# Patient Record
Sex: FEMALE | Race: WHITE | NOT HISPANIC OR LATINO | Employment: OTHER | RURAL
[De-identification: names, ages, dates, MRNs, and addresses within clinical notes are randomized per-mention and may not be internally consistent; named-entity substitution may affect disease eponyms.]

---

## 2023-09-06 ENCOUNTER — OFFICE VISIT (OUTPATIENT)
Dept: FAMILY MEDICINE | Facility: CLINIC | Age: 83
End: 2023-09-06
Payer: MEDICARE

## 2023-09-06 VITALS
HEIGHT: 68 IN | TEMPERATURE: 97 F | BODY MASS INDEX: 23.42 KG/M2 | WEIGHT: 154.5 LBS | HEART RATE: 80 BPM | RESPIRATION RATE: 18 BRPM | DIASTOLIC BLOOD PRESSURE: 64 MMHG | OXYGEN SATURATION: 96 % | SYSTOLIC BLOOD PRESSURE: 122 MMHG

## 2023-09-06 DIAGNOSIS — J06.9 UPPER RESPIRATORY TRACT INFECTION, UNSPECIFIED TYPE: ICD-10-CM

## 2023-09-06 DIAGNOSIS — J06.9 ACUTE UPPER RESPIRATORY INFECTION: Primary | ICD-10-CM

## 2023-09-06 PROCEDURE — 99213 OFFICE O/P EST LOW 20 MIN: CPT | Mod: 25,,, | Performed by: NURSE PRACTITIONER

## 2023-09-06 PROCEDURE — 99213 PR OFFICE/OUTPT VISIT, EST, LEVL III, 20-29 MIN: ICD-10-PCS | Mod: 25,,, | Performed by: NURSE PRACTITIONER

## 2023-09-06 PROCEDURE — 96372 PR INJECTION,THERAP/PROPH/DIAG2ST, IM OR SUBCUT: ICD-10-PCS | Mod: ,,, | Performed by: NURSE PRACTITIONER

## 2023-09-06 PROCEDURE — 96372 THER/PROPH/DIAG INJ SC/IM: CPT | Mod: ,,, | Performed by: NURSE PRACTITIONER

## 2023-09-06 RX ORDER — CEFTRIAXONE 1 G/1
1 INJECTION, POWDER, FOR SOLUTION INTRAMUSCULAR; INTRAVENOUS
Status: COMPLETED | OUTPATIENT
Start: 2023-09-06 | End: 2023-09-06

## 2023-09-06 RX ORDER — NITROGLYCERIN 0.4 MG/1
0.4 TABLET SUBLINGUAL EVERY 5 MIN PRN
COMMUNITY

## 2023-09-06 RX ORDER — HYDRALAZINE HYDROCHLORIDE 50 MG/1
50 TABLET, FILM COATED ORAL DAILY
COMMUNITY

## 2023-09-06 RX ORDER — POTASSIUM CHLORIDE 1.5 G/1.58G
20 POWDER, FOR SOLUTION ORAL ONCE
COMMUNITY

## 2023-09-06 RX ORDER — GABAPENTIN 100 MG/1
100 CAPSULE ORAL DAILY
COMMUNITY

## 2023-09-06 RX ORDER — DEXAMETHASONE SODIUM PHOSPHATE 4 MG/ML
4 INJECTION, SOLUTION INTRA-ARTICULAR; INTRALESIONAL; INTRAMUSCULAR; INTRAVENOUS; SOFT TISSUE
Status: COMPLETED | OUTPATIENT
Start: 2023-09-06 | End: 2023-09-06

## 2023-09-06 RX ORDER — AMLODIPINE BESYLATE 2.5 MG/1
2.5 TABLET ORAL DAILY
COMMUNITY

## 2023-09-06 RX ORDER — CLONIDINE HYDROCHLORIDE 0.1 MG/1
0.1 TABLET ORAL 2 TIMES DAILY PRN
COMMUNITY

## 2023-09-06 RX ORDER — METOPROLOL TARTRATE 50 MG/1
50 TABLET ORAL 3 TIMES DAILY
COMMUNITY

## 2023-09-06 RX ORDER — CEFDINIR 300 MG/1
300 CAPSULE ORAL 2 TIMES DAILY
Qty: 20 CAPSULE | Refills: 0 | Status: SHIPPED | OUTPATIENT
Start: 2023-09-06 | End: 2023-09-16

## 2023-09-06 RX ORDER — LOSARTAN POTASSIUM 50 MG/1
50 TABLET ORAL 2 TIMES DAILY
COMMUNITY

## 2023-09-06 RX ADMIN — DEXAMETHASONE SODIUM PHOSPHATE 4 MG: 4 INJECTION, SOLUTION INTRA-ARTICULAR; INTRALESIONAL; INTRAMUSCULAR; INTRAVENOUS; SOFT TISSUE at 03:09

## 2023-09-06 RX ADMIN — CEFTRIAXONE 1 G: 1 INJECTION, POWDER, FOR SOLUTION INTRAMUSCULAR; INTRAVENOUS at 03:09

## 2023-09-06 NOTE — PATIENT INSTRUCTIONS
Rx as directed, Tylenol/Motrin PRN fever, discomfort, Increase PO fluid intake to maintain hydration status, thin respiratory secretions; We discussed the importance of not completely suppressing cough as this is the body's natural mechanism to prevent pneumonia; RTC for persisting and/or worsening of symptoms

## 2023-09-06 NOTE — PROGRESS NOTES
"Subjective:       Darcy Yee is a 82 y.o. female who presents for evaluation of symptoms of a URI. Symptoms include congestion, cough described as productive, headache described as pressure, nasal congestion, no  fever, and sinus pressure. Onset of symptoms was 3 weeks ago, and has been unchanged since that time. Treatment to date: antihistamines and decongestants.    Review of Systems  Pertinent items are noted in HPI.     Objective:      /64 (BP Location: Left arm, Patient Position: Sitting, BP Method: Medium (Manual))   Pulse 80   Temp 97 °F (36.1 °C) (Temporal)   Resp 18   Ht 5' 8" (1.727 m)   Wt 70.1 kg (154 lb 8 oz)   SpO2 96%   BMI 23.49 kg/m²   General appearance: alert, appears stated age, and cooperative  Head: Normocephalic, without obvious abnormality, atraumatic  Eyes: conjunctivae/corneas clear. PERRL, EOM's intact. Fundi benign.  Ears: abnormal TM right ear - dull and abnormal TM left ear - dull  Nose: mild congestion, sinus tenderness bilateral  Throat: abnormal findings: mild oropharyngeal erythema and injected with pnd  Lungs: clear to auscultation bilaterally  Heart: regular rate and rhythm, S1, S2 normal, no murmur, click, rub or gallop  Extremities: extremities normal, atraumatic, no cyanosis or edema  Skin: Skin color, texture, turgor normal. No rashes or lesions  Lymph nodes: Cervical, supraclavicular, and axillary nodes normal.  Neurologic: Alert and oriented X 3, normal strength and tone. Normal symmetric reflexes. Normal coordination and gait     Assessment:      Bacterial URI     Plan:      Discussed diagnosis and treatment of URI.  Suggested symptomatic OTC remedies.  Nasal saline spray for congestion.  Cefdinir per orders.  Follow up as needed.   "

## 2024-03-20 ENCOUNTER — OFFICE VISIT (OUTPATIENT)
Dept: FAMILY MEDICINE | Facility: CLINIC | Age: 84
End: 2024-03-20
Payer: MEDICARE

## 2024-03-20 VITALS
HEIGHT: 68 IN | BODY MASS INDEX: 23.43 KG/M2 | RESPIRATION RATE: 20 BRPM | OXYGEN SATURATION: 97 % | TEMPERATURE: 98 F | SYSTOLIC BLOOD PRESSURE: 124 MMHG | DIASTOLIC BLOOD PRESSURE: 68 MMHG | WEIGHT: 154.56 LBS | HEART RATE: 67 BPM

## 2024-03-20 DIAGNOSIS — N30.00 ACUTE CYSTITIS WITHOUT HEMATURIA: Primary | ICD-10-CM

## 2024-03-20 DIAGNOSIS — R35.1 FREQUENT URINATION AT NIGHT: ICD-10-CM

## 2024-03-20 DIAGNOSIS — R30.0 DYSURIA: ICD-10-CM

## 2024-03-20 LAB
BILIRUB SERPL-MCNC: NEGATIVE MG/DL
BLOOD URINE, POC: NEGATIVE
CLARITY, POC UA: CLEAR
COLOR, POC UA: ABNORMAL
GLUCOSE UR QL STRIP: NEGATIVE
KETONES UR QL STRIP: NEGATIVE
LEUKOCYTE ESTERASE URINE, POC: ABNORMAL
NITRITE, POC UA: POSITIVE
PH, POC UA: 5
PROTEIN, POC: NEGATIVE
SPECIFIC GRAVITY, POC UA: 1.02
UROBILINOGEN, POC UA: 0.2

## 2024-03-20 PROCEDURE — 99213 OFFICE O/P EST LOW 20 MIN: CPT | Mod: 25,,, | Performed by: NURSE PRACTITIONER

## 2024-03-20 PROCEDURE — 87186 SC STD MICRODIL/AGAR DIL: CPT | Mod: ,,, | Performed by: CLINICAL MEDICAL LABORATORY

## 2024-03-20 PROCEDURE — 81002 URINALYSIS NONAUTO W/O SCOPE: CPT | Mod: ,,, | Performed by: NURSE PRACTITIONER

## 2024-03-20 PROCEDURE — 96372 THER/PROPH/DIAG INJ SC/IM: CPT | Mod: ,,, | Performed by: NURSE PRACTITIONER

## 2024-03-20 PROCEDURE — 87086 URINE CULTURE/COLONY COUNT: CPT | Mod: ,,, | Performed by: CLINICAL MEDICAL LABORATORY

## 2024-03-20 PROCEDURE — 87077 CULTURE AEROBIC IDENTIFY: CPT | Mod: ,,, | Performed by: CLINICAL MEDICAL LABORATORY

## 2024-03-20 RX ORDER — NITROFURANTOIN 25; 75 MG/1; MG/1
100 CAPSULE ORAL 2 TIMES DAILY
Qty: 10 CAPSULE | Refills: 0 | Status: SHIPPED | OUTPATIENT
Start: 2024-03-20 | End: 2024-03-25

## 2024-03-20 RX ORDER — CEFTRIAXONE 1 G/1
1 INJECTION, POWDER, FOR SOLUTION INTRAMUSCULAR; INTRAVENOUS
Status: COMPLETED | OUTPATIENT
Start: 2024-03-20 | End: 2024-03-20

## 2024-03-20 RX ADMIN — CEFTRIAXONE 1 G: 1 INJECTION, POWDER, FOR SOLUTION INTRAMUSCULAR; INTRAVENOUS at 02:03

## 2024-03-20 NOTE — PROGRESS NOTES
"Subjective:      Darcy Yee is a 83 y.o. female who complains of burning with urination and frequency. She has had symptoms for a few days. Patient also complains of back pain and fever. Patient denies vaginal discharge. Patient does not have a history of recurrent UTI. Patient does not have a history of pyelonephritis.     Review of Systems  Pertinent items are noted in HPI.      Objective:      /68   Pulse 67   Temp 97.6 °F (36.4 °C)   Resp 20   Ht 5' 8" (1.727 m)   Wt 70.1 kg (154 lb 8.7 oz)   SpO2 97%   BMI 23.50 kg/m²   General appearance: alert, appears stated age, and cooperative  Back: symmetric, no curvature. ROM normal. No CVA tenderness.  Lungs: clear to auscultation bilaterally  Heart: regular rate and rhythm, S1, S2 normal, no murmur, click, rub or gallop  Extremities: extremities normal, atraumatic, no cyanosis or edema  Skin: Skin color, texture, turgor normal. No rashes or lesions  Neurologic: Alert and oriented X 3, normal strength and tone. Normal symmetric reflexes. Normal coordination and gait    Laboratory:   Urine dipstick:  see results .    Micro exam: not done.      Assessment:      Acute cystitis       Plan:      Medications: nitrofurantoin.  Maintain adequate hydration.  Follow up if symptoms not improving, and as needed.   "

## 2024-03-22 LAB — UA COMPLETE W REFLEX CULTURE PNL UR: ABNORMAL

## 2024-12-16 ENCOUNTER — OFFICE VISIT (OUTPATIENT)
Dept: FAMILY MEDICINE | Facility: CLINIC | Age: 84
End: 2024-12-16
Payer: MEDICARE

## 2024-12-16 VITALS
WEIGHT: 156 LBS | HEIGHT: 68 IN | RESPIRATION RATE: 20 BRPM | TEMPERATURE: 97 F | SYSTOLIC BLOOD PRESSURE: 132 MMHG | BODY MASS INDEX: 23.64 KG/M2 | OXYGEN SATURATION: 95 % | HEART RATE: 85 BPM | DIASTOLIC BLOOD PRESSURE: 78 MMHG

## 2024-12-16 DIAGNOSIS — R35.1 FREQUENT URINATION AT NIGHT: ICD-10-CM

## 2024-12-16 DIAGNOSIS — J01.90 ACUTE NON-RECURRENT SINUSITIS, UNSPECIFIED LOCATION: Primary | ICD-10-CM

## 2024-12-16 DIAGNOSIS — R30.0 DYSURIA: ICD-10-CM

## 2024-12-16 DIAGNOSIS — N30.90 CYSTITIS WITHOUT HEMATURIA: ICD-10-CM

## 2024-12-16 LAB
BILIRUB SERPL-MCNC: NEGATIVE MG/DL
BLOOD URINE, POC: NEGATIVE
CLARITY, POC UA: ABNORMAL
COLOR, POC UA: ABNORMAL
GLUCOSE UR QL STRIP: NEGATIVE
KETONES UR QL STRIP: NEGATIVE
LEUKOCYTE ESTERASE URINE, POC: ABNORMAL
NITRITE, POC UA: NEGATIVE
PH, POC UA: 6.5
PROTEIN, POC: NEGATIVE
SPECIFIC GRAVITY, POC UA: 1.01
UROBILINOGEN, POC UA: 0.2

## 2024-12-16 PROCEDURE — 96372 THER/PROPH/DIAG INJ SC/IM: CPT | Mod: ,,, | Performed by: NURSE PRACTITIONER

## 2024-12-16 PROCEDURE — 87077 CULTURE AEROBIC IDENTIFY: CPT | Mod: ,,, | Performed by: CLINICAL MEDICAL LABORATORY

## 2024-12-16 PROCEDURE — 87086 URINE CULTURE/COLONY COUNT: CPT | Mod: ,,, | Performed by: CLINICAL MEDICAL LABORATORY

## 2024-12-16 PROCEDURE — 81002 URINALYSIS NONAUTO W/O SCOPE: CPT | Mod: ,,, | Performed by: NURSE PRACTITIONER

## 2024-12-16 PROCEDURE — 99213 OFFICE O/P EST LOW 20 MIN: CPT | Mod: 25,,, | Performed by: NURSE PRACTITIONER

## 2024-12-16 PROCEDURE — 87186 SC STD MICRODIL/AGAR DIL: CPT | Mod: ,,, | Performed by: CLINICAL MEDICAL LABORATORY

## 2024-12-16 RX ORDER — CEFTRIAXONE 1 G/1
1 INJECTION, POWDER, FOR SOLUTION INTRAMUSCULAR; INTRAVENOUS
Status: COMPLETED | OUTPATIENT
Start: 2024-12-16 | End: 2024-12-16

## 2024-12-16 RX ORDER — CEFDINIR 300 MG/1
300 CAPSULE ORAL 2 TIMES DAILY
Qty: 20 CAPSULE | Refills: 0 | Status: SHIPPED | OUTPATIENT
Start: 2024-12-16 | End: 2024-12-26

## 2024-12-16 RX ORDER — DEXAMETHASONE SODIUM PHOSPHATE 4 MG/ML
8 INJECTION, SOLUTION INTRA-ARTICULAR; INTRALESIONAL; INTRAMUSCULAR; INTRAVENOUS; SOFT TISSUE
Status: COMPLETED | OUTPATIENT
Start: 2024-12-16 | End: 2024-12-16

## 2024-12-16 RX ADMIN — DEXAMETHASONE SODIUM PHOSPHATE 8 MG: 4 INJECTION, SOLUTION INTRA-ARTICULAR; INTRALESIONAL; INTRAMUSCULAR; INTRAVENOUS; SOFT TISSUE at 12:12

## 2024-12-16 RX ADMIN — CEFTRIAXONE 1 G: 1 INJECTION, POWDER, FOR SOLUTION INTRAMUSCULAR; INTRAVENOUS at 12:12

## 2024-12-18 LAB — UA COMPLETE W REFLEX CULTURE PNL UR: ABNORMAL

## 2024-12-30 NOTE — PROGRESS NOTES
Shanthi Mora NP   [address] [place] [address]     PATIENT NAME: Darcy Yee  : 1940  DATE: 24  MRN: [id number]      Billing Provider: Shanthi Mora NP  Level of Service: [place] OFFICE/OUTPT VISIT, EST, LEVL III, 20-29 MIN  Patient PCP Information       Provider PCP Type    Arturo Groves MD General            Reason for Visit / Chief Complaint: Ear Fullness, Sinus Problem (X2 to 3 weeks, has facial sinus pressure, nasal drainage ans congestion, frontal headache, and eyes carolee), and Urinary Tract Infection (Lower abdomen pressure, discomfort, frequent urination at night )       Update PCP  Update Chief Complaint         History of Present Illness / Problem Focused Workflow     Darcy Yee presents to the clinic with Ear Fullness, Sinus Problem (X2 to 3 weeks, has facial sinus pressure, nasal drainage ans congestion, frontal headache, and eyes carolee), and Urinary Tract Infection (Lower abdomen pressure, discomfort, frequent urination at night )     History of Present Illness    HPI:  Patient reports sinus symptoms for a few weeks, including ear fullness, congestion, headache, sinus pressure, and hoarseness. The course has been fluctuating, with periods of improvement followed by exacerbation. She has been self-medicating with Mucinex and Claritin, with uncertain efficacy.    In the last few days, the patient has noticed increased urinary frequency, particularly at night, urinating 4-5 times the previous night compared to her usual once. She reports some lower abdominal pressure but denies burning or spasms associated with urination. Patient mentions drinking less fluid than usual due to a recent 4-hour drive.    Patient is under urologist care for an unspecified condition. She received treatment for a similar issue in the past, including a Rocephin shot around February before going on a cruise. Patient also has a history of atrial fibrillation (AFib).    MEDICATIONS:  Patient is on  Mucinex and Claritin.    MEDICAL HISTORY:  Patient has a history of atrial fibrillation and UTIs. She received a Rocephin shot in March, likely for UTI prevention before a cruise.    FAMILY HISTORY:  Family history is significant for her daughter, Eileen, who has had defibrillator and pacemaker implantation. Eileen has an underlying heart condition that was exacerbated after COVID-19 infection.    SURGICAL HISTORY:  Patient underwent pacemaker implantation when she was over 68 years old.    TEST RESULTS:  A recent urinalysis showed a minimal amount of bacteria, potentially normal skin bacteria from the sample. A urine culture from March, prior to her cruise, was sensitive to Macrobid.      ROS:  General: -fever, -chills, -fatigue, -weight gain, -weight loss  Eyes: -vision changes, -redness, -discharge  ENT: -ear pain, +nasal congestion, -sore throat  Cardiovascular: -chest pain, -palpitations, -lower extremity edema  Respiratory: -cough, -shortness of breath  Gastrointestinal: -abdominal pain, -nausea, -vomiting, -diarrhea, -constipation, -blood in stool  Genitourinary: -dysuria, -hematuria, +frequency  Musculoskeletal: -joint pain, -muscle pain  Skin: -rash, -lesion  Neurological: +headache, -dizziness, -numbness, -tingling  Psychiatric: -anxiety, -depression, -sleep difficulty                Medical / Social / Family History     Past Medical History:   Diagnosis Date    Hypertension        History reviewed. No pertinent surgical history.    Social History  Ms.  reports that she has never smoked. She has never been exposed to tobacco smoke. She has never used smokeless tobacco. She reports that she does not drink alcohol and does not use drugs.    Family History  Ms.'s family history is not on file.    Medications and Allergies     Medications  No outpatient medications have been marked as taking for the 12/16/24 encounter (Office Visit) with Shanthi Mora NP.       Allergies  Review of patient's allergies  "indicates:   Allergen Reactions    Celecoxib Swelling    Aspirin Hives    Dye     Iodinated contrast media Hives    Lisinopril Hives    Nsaids (non-steroidal anti-inflammatory drug)      Other Reaction(s):  Swelling, Hives    Lidocaine hcl Nausea And Vomiting    Penicillins Rash and Hives       Physical Examination   /78 (BP Location: Left arm, Patient Position: Sitting)   Pulse 85   Temp 97.2 °F (36.2 °C)   Resp 20   Ht 5' 7.99" (1.727 m)   Wt 70.8 kg (156 lb)   LMP  (Exact Date)   SpO2 95%   BMI 23.73 kg/m²    Physical Exam    General: No acute distress. Well-developed. Well-nourished.  Eyes: EOMI. Sclerae anicteric.  HENT: Normocephalic. Atraumatic. Nares patent. Moist oral mucosa. Nasal congestion present. Posterior pharynx inj. With PND. Bilateral TM Dull.  Cardiovascular: Regular rate. Regular rhythm. No murmurs. No rubs. No gallops. Normal S1, S2.  Respiratory: Normal respiratory effort. Clear to auscultation bilaterally. No rales. No rhonchi. No wheezing.  Musculoskeletal: No  obvious deformity.  Extremities: No lower extremity edema.  Neurological: Alert & oriented x3. No slurred speech. Normal gait.  Psychiatric: Normal mood. Normal affect. Good insight. Good judgment.  Skin: Warm. Dry. No rash.           Assessment and Plan (including Health Maintenance)      Problem List  Smart Sets  Document Outside HM   :    Assessment & Plan    IMPRESSION:  - Considered sinus infection due to persistent symptoms of ear fullness, congestion, headache, and sinus pressure  - Evaluated for possible UTI based on recent nocturia, though minimal bacteria on dipstick  - Will treat empirically for both sinus infection and potential UTI  - Opted for steroid injection for sinus symptoms, considering patient's history of atrial fibrillation  - Chose Cefdinir as oral antibiotic for sinus infection  - Selected Rocephin injection for potential UTI coverage pending culture results    SINUS INFECTION:  - Prescribed " Cefdinir for sinus infection.  - Administered steroid injection for sinus symptoms.  - Continued Mucinex and Claritin as previously prescribed.  - Patient reports ongoing sinus symptoms for 2 weeks, including ear fullness, congestion, headache, and sinus pressure, with fluctuating intensity.  - Evaluated the effectiveness of current medications (Mucinex and Claritin) for symptom relief.  - Instructed the patient to follow up if symptoms persist or worsen despite treatment.  - Patient reports hoarseness.  - Assessed the patient's hoarseness and its potential relation to the sinus infection.    URINARY TRACT INFECTION (UTI):  - Administered Rocephin injection for potential UTI.  - Ordered urine culture, with results expected on Wednesday.  - Patient reports increased urinary frequency, especially nocturia, but denies dysuria or bladder spasms.  - Performed dipstick test, which showed minimal bacteriuria.  - Instructed the patient to contact the office when urine culture results are available on Wednesday.  - Advised to follow up if UTI symptoms persist or worsen.  - Plan to prescribe oral antibiotic (possibly Macrobid) based on urine culture results.    ATRIAL FIBRILLATION:  - Acknowledged the patient's history of atrial fibrillation.  - Assessed the potential impact of current symptoms on the patient's cardiac condition.  - Noted the presence of a pacemaker in the patient's medical history.  - Considered potential interactions between current symptoms, treatments, and the patient's cardiac device.              Health Maintenance Due   Topic Date Due    Lipid Panel  Never done    TETANUS VACCINE  Never done    Shingles Vaccine (1 of 2) Never done    Pneumococcal Vaccines (Age 50+) (1 of 1 - PCV) Never done    RSV Vaccine (Age 60+ and Pregnant patients) (1 - 1-dose 75+ series) Never done    COVID-19 Vaccine (3 - 2024-25 season) 09/01/2024       Problem List Items Addressed This Visit    None  Visit Diagnoses        Acute non-recurrent sinusitis, unspecified location    -  Primary    Relevant Medications    dexAMETHasone injection 8 mg (Completed)    cefTRIAXone injection 1 g (Completed)    Dysuria        Relevant Orders    POCT URINE DIPSTICK WITHOUT MICROSCOPE (Completed)    Frequent urination at night        Relevant Orders    POCT URINE DIPSTICK WITHOUT MICROSCOPE (Completed)    Cystitis without hematuria        Relevant Medications    cefTRIAXone injection 1 g (Completed)    Other Relevant Orders    POCT URINE DIPSTICK WITHOUT MICROSCOPE (Completed)    Urine culture (Completed)            The patient has no Health Maintenance topics of status Not Due    No future appointments.         Signature:  Shantih Mora NP      [address]   [place] [address]    Date of encounter: 12/16/24

## 2025-06-25 ENCOUNTER — OFFICE VISIT (OUTPATIENT)
Dept: FAMILY MEDICINE | Facility: CLINIC | Age: 85
End: 2025-06-25
Payer: MEDICARE

## 2025-06-25 VITALS
OXYGEN SATURATION: 95 % | WEIGHT: 157.69 LBS | TEMPERATURE: 98 F | RESPIRATION RATE: 20 BRPM | HEIGHT: 67 IN | HEART RATE: 62 BPM | SYSTOLIC BLOOD PRESSURE: 140 MMHG | DIASTOLIC BLOOD PRESSURE: 82 MMHG | BODY MASS INDEX: 24.75 KG/M2

## 2025-06-25 DIAGNOSIS — R09.81 NASAL CONGESTION: ICD-10-CM

## 2025-06-25 DIAGNOSIS — J01.90 ACUTE NON-RECURRENT SINUSITIS, UNSPECIFIED LOCATION: Primary | ICD-10-CM

## 2025-06-25 DIAGNOSIS — R05.9 COUGH, UNSPECIFIED TYPE: ICD-10-CM

## 2025-06-25 PROCEDURE — 99213 OFFICE O/P EST LOW 20 MIN: CPT | Mod: 25,,, | Performed by: NURSE PRACTITIONER

## 2025-06-25 PROCEDURE — 96372 THER/PROPH/DIAG INJ SC/IM: CPT | Mod: ,,, | Performed by: NURSE PRACTITIONER

## 2025-06-25 RX ORDER — CEFTRIAXONE 1 G/1
1 INJECTION, POWDER, FOR SOLUTION INTRAMUSCULAR; INTRAVENOUS
Status: COMPLETED | OUTPATIENT
Start: 2025-06-25 | End: 2025-06-25

## 2025-06-25 RX ORDER — DEXAMETHASONE SODIUM PHOSPHATE 4 MG/ML
8 INJECTION, SOLUTION INTRA-ARTICULAR; INTRALESIONAL; INTRAMUSCULAR; INTRAVENOUS; SOFT TISSUE
Status: COMPLETED | OUTPATIENT
Start: 2025-06-25 | End: 2025-06-25

## 2025-06-25 RX ORDER — CEFDINIR 300 MG/1
300 CAPSULE ORAL 2 TIMES DAILY
Qty: 20 CAPSULE | Refills: 0 | Status: SHIPPED | OUTPATIENT
Start: 2025-06-25 | End: 2025-07-05

## 2025-06-25 RX ADMIN — CEFTRIAXONE 1 G: 1 INJECTION, POWDER, FOR SOLUTION INTRAMUSCULAR; INTRAVENOUS at 11:06

## 2025-06-25 RX ADMIN — DEXAMETHASONE SODIUM PHOSPHATE 8 MG: 4 INJECTION, SOLUTION INTRA-ARTICULAR; INTRALESIONAL; INTRAMUSCULAR; INTRAVENOUS; SOFT TISSUE at 11:06

## 2025-06-25 NOTE — PROGRESS NOTES
Shanthi Mora NP   [address] [place] [address]     PATIENT NAME: Darcy Yee  : 1940  DATE: 25  MRN: [id number]      Billing Provider: Shanthi Mora NP  Level of Service:   Patient PCP Information       Provider PCP Type    Arturo Groves MD General            Reason for Visit / Chief Complaint: Otalgia, Tinnitus, Nasal Congestion, and Headache (X2 weeks)       Update PCP  Update Chief Complaint         History of Present Illness / Problem Focused Workflow     Darcy Yee presents to the clinic with Otalgia, Tinnitus, Nasal Congestion, and Headache (X2 weeks)     History of Present Illness    HPI:  Patient reports not feeling well for approximately 2 weeks. She has early morning headaches and worsening tinnitus. She describes a dull ache in one ear and tenderness in the facial area, particularly in the sinuses. She initially thought she had a toothache and was evaluated by a dentist. To manage symptoms, she has tried OTC medications including Zyrtec, Tylenol, Mucinex, and Claritin, but these did not provide significant relief. She also mentions weakness in her legs, which may be related to her pacemaker. She is due for a routine pacemaker check in about 3-4 weeks but is considering calling for an earlier appointment due to the leg weakness.    She denies having COVID-19.    MEDICATIONS:  Patient is on Tylenol, Mucinex, and Claritin as needed for symptoms.    MEDICAL HISTORY:  Patient has a history of tinnitus and a pacemaker.      ROS:  General: -fever, -chills, -fatigue, -weight gain, -weight loss  Eyes: -vision changes, -redness, -discharge  ENT: +ear pain, +nasal congestion, -sore throat, +tinnitus, +post nasal drip  Cardiovascular: -chest pain, -palpitations, -lower extremity edema  Respiratory: -cough, -shortness of breath  Gastrointestinal: -abdominal pain, -nausea, -vomiting, -diarrhea, -constipation, -blood in stool  Genitourinary: -dysuria, -hematuria,  -frequency  Musculoskeletal: -joint pain, -muscle pain, +muscle weakness, +weakness  Skin: -rash, -lesion  Neurological: +headache, -dizziness, -numbness, -tingling  Psychiatric: -anxiety, -depression, -sleep difficulty  Head: +head pain, +sinus pressure                Medical / Social / Family History     Past Medical History:   Diagnosis Date    Hypertension        History reviewed. No pertinent surgical history.    Social History  Ms.  reports that she has never smoked. She has never been exposed to tobacco smoke. She has never used smokeless tobacco. She reports that she does not drink alcohol and does not use drugs.    Family History  Ms.'s family history is not on file.    Medications and Allergies     Medications  Outpatient Medications Marked as Taking for the 6/25/25 encounter (Office Visit) with Shanthi Moar NP   Medication Sig Dispense Refill    amLODIPine (NORVASC) 2.5 MG tablet Take 2.5 mg by mouth once daily.      apixaban (ELIQUIS) 5 mg Tab Take 5 mg by mouth 2 (two) times daily.      cloNIDine (CATAPRES) 0.1 MG tablet Take 0.1 mg by mouth 2 (two) times daily as needed.      gabapentin (NEURONTIN) 100 MG capsule Take 100 mg by mouth once daily.      hydrALAZINE (APRESOLINE) 50 MG tablet Take 50 mg by mouth once daily.      losartan (COZAAR) 50 MG tablet Take 50 mg by mouth 2 (two) times a day.      metoprolol tartrate (LOPRESSOR) 50 MG tablet Take 50 mg by mouth 3 (three) times daily.      nitroGLYCERIN (NITROSTAT) 0.4 MG SL tablet Place 0.4 mg under the tongue every 5 (five) minutes as needed for Chest pain.      potassium chloride (KLOR-CON) 20 mEq Pack Take 20 mEq by mouth once.       Current Facility-Administered Medications for the 6/25/25 encounter (Office Visit) with Shanthi Mora NP   Medication Dose Route Frequency Provider Last Rate Last Admin    cefTRIAXone injection 1 g  1 g Intramuscular 1 time in Clinic/HOD Shanthi Mora NP        dexAMETHasone injection 8 mg  8 mg  "Intramuscular 1 time in Clinic/HOD Shanthi Mora NP           Allergies  Review of patient's allergies indicates:   Allergen Reactions    Celecoxib Swelling    Aspirin Hives    Dye     Iodinated contrast media Hives    Lisinopril Hives    Nsaids (non-steroidal anti-inflammatory drug)      Other Reaction(s):  Swelling, Hives    Lidocaine hcl Nausea And Vomiting    Penicillins Rash and Hives       Physical Examination   BP (!) 158/92 (BP Location: Left arm, Patient Position: Sitting)   Pulse 62   Temp 97.6 °F (36.4 °C) (Oral)   Resp 20   Ht 5' 7" (1.702 m)   Wt 71.5 kg (157 lb 11.2 oz)   SpO2 95%   BMI 24.70 kg/m²    Physical Exam    General: No acute distress. Well-developed. Well-nourished.  Eyes: EOMI. Sclerae anicteric.  HENT: Normocephalic. Atraumatic. Nares patent. Moist oral mucosa. Pharyngeal erythema. Post nasal drip. Sinus pressure present. Nasal congestion present.  Ears: Left TM dull. Right TM dull.  Cardiovascular: Regular rate. Regular rhythm. No murmurs. No rubs. No gallops. Normal S1, S2.  Respiratory: Normal respiratory effort. Clear to auscultation bilaterally. No rales. No rhonchi. No wheezing.  Musculoskeletal: No  obvious deformity.  Extremities: No lower extremity edema.  Neurological: Alert & oriented x3. No slurred speech. Normal gait.  Psychiatric: Normal mood. Normal affect. Good insight. Good judgment.  Skin: Warm. Dry. No rash.  Mouth: Palatal edema.           Assessment and Plan (including Health Maintenance)      Problem List  Smart Sets  Document Outside HM   :    Assessment & Plan    - Assessed symptoms as likely sinus infection based on reported headaches, sinus tenderness, nasal congestion, and post-nasal drip.  - Patient reports waking up with headaches, dull ache in the ear, tenderness in sinuses, and roof of mouth swelling.  - Examination revealed mild erythema in posterior pharynx with post nasal drip and nasal congestion.  - Bilateral tympanic membrane dullness was also " observed.  - Ruled out COVID-19 as unlikely due to duration of symptoms exceeding typical 5-day course.  - Administered injectable medication in office for immediate symptom relief and instructed patient to start oral antibiotic tomorrow following today's injectable treatment.          Health Maintenance Due   Topic Date Due    Lipid Panel  Never done    TETANUS VACCINE  Never done    Shingles Vaccine (1 of 2) Never done    RSV Vaccine (Age 60+ and Pregnant patients) (1 - 1-dose 75+ series) Never done    COVID-19 Vaccine (4 - 2024-25 season) 09/01/2024       Problem List Items Addressed This Visit    None  Visit Diagnoses         Acute non-recurrent sinusitis, unspecified location    -  Primary    Relevant Medications    dexAMETHasone injection 8 mg    cefTRIAXone injection 1 g    cefdinir (OMNICEF) 300 MG capsule      Cough, unspecified type        Relevant Orders    POCT COVID-19 Rapid Screening      Nasal congestion        Relevant Orders    POCT COVID-19 Rapid Screening            The patient has no Health Maintenance topics of status Not Due    No future appointments.         Signature:  Shanthi Mora NP      [address] [place] [address]    Date of encounter: 6/25/25